# Patient Record
Sex: FEMALE | Race: AMERICAN INDIAN OR ALASKA NATIVE
[De-identification: names, ages, dates, MRNs, and addresses within clinical notes are randomized per-mention and may not be internally consistent; named-entity substitution may affect disease eponyms.]

---

## 2017-04-12 ENCOUNTER — HOSPITAL ENCOUNTER (EMERGENCY)
Dept: HOSPITAL 31 - C.ER | Age: 22
LOS: 1 days | Discharge: HOME | End: 2017-04-13
Payer: MEDICAID

## 2017-04-12 VITALS — OXYGEN SATURATION: 100 %

## 2017-04-12 DIAGNOSIS — Z32.01: Primary | ICD-10-CM

## 2017-04-12 NOTE — C.PDOC
History Of Present Illness


Patient presents to the ER requesting a pregnancy test. Patient states she 

missed her menstrual period which was supposed to be on 03/22. Denies any 

physical complaints at this time. 


Time Seen by Provider: 04/12/17 23:49


Chief Complaint (Nursing): Abdominal Pain


History Per: Patient


History/Exam Limitations: no limitations


Current Symptoms Are (Timing): Still Present


Associated Symptoms: denies: Fever, Chills, Nausea, Vomiting, Diarrhea





Past Medical History


Reviewed: Historical Data, Nursing Documentation, Vital Signs


Vital Signs: 


 Last Vital Signs











Temp  98.4 F   04/12/17 23:42


 


Pulse  69   04/12/17 23:42


 


Resp  16   04/12/17 23:42


 


BP  125/73   04/12/17 23:42


 


Pulse Ox  100   04/13/17 01:16











Family History: States: No Known Family Hx





- Social History


Hx Alcohol Use: Yes


Hx Substance Use: No





- Immunization History


Hx Tetanus Toxoid Vaccination: No


Hx Influenza Vaccination: No


Hx Pneumococcal Vaccination: No





Review Of Systems


Constitutional: Negative for: Fever, Chills


Respiratory: Negative for: Cough, Shortness of Breath


Gastrointestinal: Negative for: Nausea, Vomiting, Abdominal Pain, Diarrhea





Physical Exam





- Physical Exam


Appears: Well, Non-toxic


Skin: Warm, Dry


Oral Mucosa: Moist


Cardiovascular: Rhythm Regular


Respiratory: No Rales, No Rhonchi, No Wheezing


Gastrointestinal/Abdominal: Soft, No Tenderness


Neurological/Psych: Oriented x3





ED Course And Treatment





- Laboratory Results


Result Diagrams: 


 04/13/17 00:30





 04/13/17 00:30


O2 Sat by Pulse Oximetry: 100 (Room air)


Pulse Ox Interpretation: Normal


Progress Note: Blood type and screen, HCG quantitative test, and blood work 

ordered.


Reevaluation Time: 01:20


Reassessment Condition: Improved





Disposition


Counseled Patient/Family Regarding: Studies Performed, Diagnosis, Need For 

Followup





- Disposition


Referrals: 


[facility] at [facility] [Outside]


Disposition: HOME/ ROUTINE


Disposition Time: 23:50


Condition: FAIR


Instructions:  Pregnancy (ED)





- Clinical Impression


Clinical Impression: 


 Pregnancy





- Scribe Statement


The provider has reviewed the documentation as recorded by the Scribe


Jimmy Mchugh





All medical record entries made by the Scribe were at my direction and 

personally dictated by me. I have reviewed the chart and agree that the record 

accurately reflects my personal performance of the history, physical exam, 

medical decision making, and the department course for this patient. I have 

also personally directed, reviewed, and agree with the discharge instructions 

and disposition.

## 2017-04-13 VITALS
HEART RATE: 68 BPM | RESPIRATION RATE: 18 BRPM | SYSTOLIC BLOOD PRESSURE: 101 MMHG | TEMPERATURE: 98.6 F | DIASTOLIC BLOOD PRESSURE: 58 MMHG

## 2017-04-13 LAB
ALBUMIN/GLOB SERPL: 1.1 {RATIO} (ref 1–2.1)
ALP SERPL-CCNC: 57 U/L (ref 38–126)
ALT SERPL-CCNC: 13 U/L (ref 9–52)
AST SERPL-CCNC: 21 U/L (ref 14–36)
BACTERIA #/AREA URNS HPF: (no result) /[HPF]
BASOPHILS # BLD AUTO: 0 K/UL (ref 0–0.2)
BASOPHILS NFR BLD: 0.4 % (ref 0–2)
BILIRUB SERPL-MCNC: 0.5 MG/DL (ref 0.2–1.3)
BILIRUB UR-MCNC: NEGATIVE MG/DL
BUN SERPL-MCNC: 6 MG/DL (ref 7–17)
CALCIUM SERPL-MCNC: 8.3 MG/DL (ref 8.6–10.4)
CHLORIDE SERPL-SCNC: 100 MMOL/L (ref 98–107)
CO2 SERPL-SCNC: 23 MMOL/L (ref 22–30)
EOSINOPHIL # BLD AUTO: 0.1 K/UL (ref 0–0.7)
EOSINOPHIL NFR BLD: 1.2 % (ref 0–4)
ERYTHROCYTE [DISTWIDTH] IN BLOOD BY AUTOMATED COUNT: 16.8 % (ref 11.5–14.5)
GLOBULIN SER-MCNC: 3.6 GM/DL (ref 2.2–3.9)
GLUCOSE SERPL-MCNC: 98 MG/DL (ref 65–105)
GLUCOSE UR STRIP-MCNC: NORMAL MG/DL
HCT VFR BLD CALC: 36.9 % (ref 34–47)
INR PPP: 1
KETONES UR STRIP-MCNC: NEGATIVE MG/DL
LEUKOCYTE ESTERASE UR-ACNC: (no result) LEU/UL
LYMPHOCYTES # BLD AUTO: 2 K/UL (ref 1–4.3)
LYMPHOCYTES NFR BLD AUTO: 31.9 % (ref 20–40)
MCH RBC QN AUTO: 27.3 PG (ref 27–31)
MCHC RBC AUTO-ENTMCNC: 32.5 G/DL (ref 33–37)
MCV RBC AUTO: 84.1 FL (ref 81–99)
MONOCYTES # BLD: 0.4 K/UL (ref 0–0.8)
MONOCYTES NFR BLD: 6.1 % (ref 0–10)
NRBC BLD AUTO-RTO: 0 % (ref 0–2)
PH UR STRIP: 6 [PH] (ref 5–8)
PLATELET # BLD: 235 K/UL (ref 130–400)
PMV BLD AUTO: 8.3 FL (ref 7.2–11.7)
POTASSIUM SERPL-SCNC: 3.8 MMOL/L (ref 3.6–5.2)
PROT SERPL-MCNC: 7.6 G/DL (ref 6.3–8.3)
PROT UR STRIP-MCNC: NEGATIVE MG/DL
RBC # UR STRIP: NEGATIVE /UL
RBC #/AREA URNS HPF: 1 /HPF (ref 0–3)
SODIUM SERPL-SCNC: 140 MMOL/L (ref 132–148)
SP GR UR STRIP: 1 (ref 1–1.03)
UROBILINOGEN UR-MCNC: NORMAL MG/DL (ref 0.2–1)
WBC # BLD AUTO: 6.2 K/UL (ref 4.8–10.8)
WBC #/AREA URNS HPF: 1 /HPF (ref 0–5)

## 2018-11-03 ENCOUNTER — HOSPITAL ENCOUNTER (EMERGENCY)
Dept: HOSPITAL 31 - C.ER | Age: 23
Discharge: LEFT BEFORE DISCHARGE | End: 2018-11-03
Payer: MEDICAID

## 2018-11-03 VITALS
DIASTOLIC BLOOD PRESSURE: 107 MMHG | SYSTOLIC BLOOD PRESSURE: 155 MMHG | HEART RATE: 72 BPM | TEMPERATURE: 99 F | RESPIRATION RATE: 20 BRPM

## 2018-11-03 VITALS — OXYGEN SATURATION: 100 %

## 2018-11-03 DIAGNOSIS — J11.1: ICD-10-CM

## 2018-11-03 DIAGNOSIS — F17.210: ICD-10-CM

## 2018-11-03 DIAGNOSIS — I10: Primary | ICD-10-CM

## 2018-11-03 LAB
ALBUMIN SERPL-MCNC: 4.8 [, G/DL] (ref 3.5–5)
ALBUMIN/GLOB SERPL: 1.4 [,] (ref 1–2.1)
ALT SERPL-CCNC: 25 [, U/L] (ref 9–52)
AST SERPL-CCNC: 35 [, U/L] (ref 14–36)
BACTERIA #/AREA URNS HPF: (no result) [,]
BASOPHILS # BLD AUTO: 0.1 [, K/UL] (ref 0–0.2)
BASOPHILS NFR BLD: 0.7 [, %] (ref 0–2)
BILIRUB UR-MCNC: NEGATIVE [,]
BUN SERPL-MCNC: 8 [, MG/DL] (ref 7–17)
CALCIUM SERPL-MCNC: 10 [, MG/DL] (ref 8.6–10.4)
EOSINOPHIL # BLD AUTO: 0.5 [, K/UL] (ref 0–0.7)
EOSINOPHIL NFR BLD: 6.8 [, %] (ref 0–4)
ERYTHROCYTE [DISTWIDTH] IN BLOOD BY AUTOMATED COUNT: 13.5 [, %] (ref 11.5–14.5)
GFR NON-AFRICAN AMERICAN: > 60 [,]
GLUCOSE UR STRIP-MCNC: NORMAL [, MG/DL]
HGB BLD-MCNC: 14.7 [, G/DL] (ref 11–16)
LEUKOCYTE ESTERASE UR-ACNC: (no result) [, LEU/UL]
LYMPHOCYTES # BLD AUTO: 2.1 [, K/UL] (ref 1–4.3)
LYMPHOCYTES NFR BLD AUTO: 27.5 [, %] (ref 20–40)
MCH RBC QN AUTO: 31.6 [, PG] (ref 27–31)
MCHC RBC AUTO-ENTMCNC: 34.6 [, G/DL] (ref 33–37)
MCV RBC AUTO: 91.1 [, FL] (ref 81–99)
MONOCYTES # BLD: 0.5 [, K/UL] (ref 0–0.8)
MONOCYTES NFR BLD: 6.9 [, %] (ref 0–10)
NEUTROPHILS # BLD: 4.5 [, K/UL] (ref 1.8–7)
NEUTROPHILS NFR BLD AUTO: 58.1 [, %] (ref 50–75)
NRBC BLD AUTO-RTO: 0.1 [, %] (ref 0–2)
PH UR STRIP: 7 [,] (ref 5–8)
PLATELET # BLD: 298 [, K/UL] (ref 130–400)
PMV BLD AUTO: 7.9 [, FL] (ref 7.2–11.7)
PROT UR STRIP-MCNC: NEGATIVE [, MG/DL]
RBC # BLD AUTO: 4.65 [, MIL/UL] (ref 3.8–5.2)
RBC # UR STRIP: NEGATIVE [,]
SP GR UR STRIP: 1 [,] (ref 1–1.03)
SQUAMOUS EPITHIAL: 1 [, /HPF] (ref 0–5)
UROBILINOGEN UR-MCNC: NORMAL [, MG/DL] (ref 0.2–1)
WBC # BLD AUTO: 7.8 [, K/UL] (ref 4.8–10.8)

## 2018-11-03 PROCEDURE — 93005 ELECTROCARDIOGRAM TRACING: CPT

## 2018-11-03 PROCEDURE — 96374 THER/PROPH/DIAG INJ IV PUSH: CPT

## 2018-11-03 PROCEDURE — 85025 COMPLETE CBC W/AUTO DIFF WBC: CPT

## 2018-11-03 PROCEDURE — 71046 X-RAY EXAM CHEST 2 VIEWS: CPT

## 2018-11-03 PROCEDURE — 81001 URINALYSIS AUTO W/SCOPE: CPT

## 2018-11-03 PROCEDURE — 99285 EMERGENCY DEPT VISIT HI MDM: CPT

## 2018-11-03 PROCEDURE — 87070 CULTURE OTHR SPECIMN AEROBIC: CPT

## 2018-11-03 PROCEDURE — 87430 STREP A AG IA: CPT

## 2018-11-03 PROCEDURE — 80053 COMPREHEN METABOLIC PANEL: CPT

## 2018-11-03 NOTE — C.PDOC
History Of Present Illness





23 years old female presents to ED for complaint of continuous cough, cold, sore

throat, and fever that began 4 days ago. Denies any medical problems or any 

other physical complaints. 


Time Seen by Provider: 11/03/18 08:40


Chief Complaint (Nursing): ENT Problem


History Per: Patient


History/Exam Limitations: None


Onset/Duration Of Symptoms: Days (4)


Current Symptoms Are (Timing): Still Present


Symptoms Have Been: Continuous


Anticoagulant/Antiplatlet Use?: Unknown


Recent Aspirin Use: Unknown





Past Medical History


Reviewed: Historical Data, Nursing Documentation, Vital Signs


Vital Signs: 





                                Last Vital Signs











Temp  98.9 F   11/03/18 08:29


 


Pulse  84   11/03/18 08:29


 


Resp  16   11/03/18 08:29


 


BP  171/133 H  11/03/18 08:29


 


Pulse Ox  100   11/03/18 08:29














- Medical History


PMH: No Chronic Diseases


Surgical History: No Surg Hx


Family History: States: Unknown Family Hx





- Social History


Hx Alcohol Use: Yes


Hx Substance Use: No





- Immunization History


Hx Tetanus Toxoid Vaccination: No


Hx Influenza Vaccination: No


Hx Pneumococcal Vaccination: No





Review Of Systems


Constitutional: Positive for: Fever, Other (Cold symptoms ).  Negative for: 

Chills


ENT: Positive for: Throat Pain


Respiratory: Positive for: Cough


Gastrointestinal: Negative for: Nausea, Vomiting, Diarrhea


Skin: Negative for: Rash


Neurological: Negative for: Weakness, Numbness





Physical Exam





- Physical Exam


Appears: Non-toxic, No Acute Distress


Skin: Normal Color, Warm, Dry, No Rash


Head: Atraumatic, Normacephalic


Eye(s): bilateral: Normal Inspection, PERRL, EOMI


Ear(s): Bilateral: Normal


Nose: Normal


Oral Mucosa: Moist


Tongue: Normal Appearing


Lips: Normal Appearing


Throat: Erythema (Mild of pharynx)


Neck: Normal ROM, Supple


Chest: Symmetrical, No Tenderness


Cardiovascular: Rhythm Regular, No Murmur


Respiratory: Normal Breath Sounds, No Rales, No Rhonchi, No Wheezing


Gastrointestinal/Abdominal: Normal Exam, Bowel Sounds (Active ), Soft, No 

Tenderness


Extremity: Normal ROM


Extremity: Bilateral: Atraumatic, Normal Color And Temperature, Normal ROM


Pulses: Left Radial: Normal, Right Radial: Normal


Neurological/Psych: Oriented x3, Normal Speech


Gait: Steady





ED Course And Treatment





- Laboratory Results


Result Diagrams: 


                                 11/03/18 10:48





                                 11/03/18 10:48


Lab Interpretation: Normal


Urine Pregnancy POC: Negative


ECG: Interpreted By Me


ECG Rhythm: Sinus Rhythm


O2 Sat by Pulse Oximetry: 100 (RA)


Pulse Ox Interpretation: Normal





- Radiology


CXR: Interpreted by Me


CXR Interpretation: Yes: No Acute Disease





- Other Rad


  ** CXR


X-Ray: Viewed By Me, Read By Radiologist


Interpretation: Date of service:  11/03/2018.  HISTORY:  cough.  COMPARISON:  No

prior.  TECHNIQUE:  Chest PA and lateral.  FINDINGS:  LUNGS:  No active pulmon

ashley disease.  PLEURA:  No significant pleural effusion identified. No 

pneumothorax apparent.  CARDIOVASCULAR:  No aortic atherosclerotic calcification

present.  Normal cardiac size. No pulmonary vascular congestion.  OSSEOUS 

STRUCTURES:  No significant abnormalities.  VISUALIZED UPPER ABDOMEN:  Normal.  

OTHER FINDINGS:  None.  IMPRESSION:  No acute cardiopulmonary disease 

appreciated.


Progress Note: Patient found to have elevated B/P and reports no history of 

Hypertension.  Trated with norvasc 5 mg PO.  Patient request discharge and will 

sign out AMA.  Patient advised to follow up with PMD for evaluation of B/P


Reassessment Condition: Improved





Medical Decision Making


Medical Decision Making: 





Plan:


* Rapid Strep


* Throat Culture


* CXR





Re-Evaluation:


The patient declines to have further medical evaluation and treatment and wishes

to leave the Emergency Department. This action is against my medical advice to 

the patient, and with informed refusal. The patient was told that evaluation and

treatment are necessary and a full explanation of the rationale was given. The 

risks of leaving were explained to the patient and include, but are not limited 

to, worsening of known or currently unknown conditions, permanent disability and

death from undiagnosed or untreated conditions The patient has the capacity to 

make this informed decision and understands the clinical situation and my 

explanation of the risks of leaving. The patient voluntarily accepts these 

risks, and a signed AMA form documenting our conversation was obtained. The 

patient was given the opportunity to ask questions and reconsider. The patient 

was encouraged to return to the Emergency Department at any time for further 

care.





Disposition


Counseled Patient/Family Regarding: Studies Performed, Diagnosis, Need For 

Followup, Rx Given





- Disposition


Disposition: AGAINST MEDICAL ADVICE


Disposition Time: 12:40


Condition: STABLE


Additional Instructions: 


Follow up with your PMD for evaluation of high B/P


Prescriptions: 


amLODIPine [Norvasc] 5 mg PO DAILY #30 tab


Instructions:  Sore Throat in Adults, High Blood Pressure (DC), Viral 

Pharyngitis  (DC)


Forms:  [facility] (English)





- POA


Present On Arrival: None





- Clinical Impression


Clinical Impression: 


 Hypertension, Flu-like symptoms








- PA / NP / Resident Statement


MD/DO has reviewed & agrees with the documentation as recorded.





- Scribe Statement


The provider has reviewed the documentation as recorded by the Floribsoto Leonard





All medical record entries made by the Laura were at my direction and 

personally dictated by me. I have reviewed the chart and agree that the record 

accurately reflects my personal performance of the history, physical exam, 

medical decision making, and the department course for this patient. I have also

 personally directed, reviewed, and agree with the discharge instructions and 

disposition.

## 2019-03-25 ENCOUNTER — HOSPITAL ENCOUNTER (EMERGENCY)
Dept: HOSPITAL 31 - C.ER | Age: 24
Discharge: HOME | End: 2019-03-25
Payer: MEDICAID

## 2019-03-25 VITALS
DIASTOLIC BLOOD PRESSURE: 108 MMHG | TEMPERATURE: 97.5 F | SYSTOLIC BLOOD PRESSURE: 147 MMHG | RESPIRATION RATE: 18 BRPM | HEART RATE: 100 BPM | OXYGEN SATURATION: 98 %
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
## 2019-03-25 DIAGNOSIS — S90.31XA: Primary | ICD-10-CM

## 2019-03-25 DIAGNOSIS — L30.9: ICD-10-CM

## 2019-03-25 NOTE — C.PDOC
History Of Present Illness


23 year old female presents to the ED for evaluation of right leg pain which 

began today. Patient states that a couch fell onto her right foot, and then she 

developed pain to her entire right leg shortly after. Patient also indicates 

eczema to her antecubital regions. She denies fever, chills, any other 

trauma/injuries.


Time Seen by Provider: 03/25/19 19:28


Chief Complaint (Nursing): Lower Extremity Problem/Injury


History Per: Patient


History/Exam Limitations: no limitations


Onset/Duration Of Symptoms: Hrs


Current Symptoms Are (Timing): Still Present


Additional History Per: Patient





Past Medical History


Reviewed: Historical Data, Nursing Documentation, Vital Signs


Vital Signs: 





                                Last Vital Signs











Temp  97.5 F L  03/25/19 19:04


 


Pulse  100 H  03/25/19 19:04


 


Resp  18   03/25/19 19:04


 


BP  147/108 H  03/25/19 19:04


 


Pulse Ox  98   03/25/19 19:04














- Medical History


PMH: No Chronic Diseases


Surgical History: No Surg Hx


Family History: States: Unknown Family Hx





- Social History


Hx Alcohol Use: Yes


Hx Substance Use: No





- Immunization History


Hx Tetanus Toxoid Vaccination: No


Hx Influenza Vaccination: Yes


Hx Pneumococcal Vaccination: Yes





Review Of Systems


Constitutional: Negative for: Fever, Chills, Weakness


Musculoskeletal: Positive for: Leg Pain (right), Foot Pain (right)


Neurological: Negative for: Weakness, Numbness





Physical Exam





- Physical Exam


Appears: Well, Non-toxic, No Acute Distress


Skin: Warm, Dry, Rash (dry eczema-appearing rash to bilateral antecubital 

regions )


Extremity: Normal ROM, Tenderness (immediately below right lateral malleolus ), 

No Calf Tenderness, Capillary Refill (less than 2 seconds ), No Deformity, No 

Swelling (right foot ), Other (no erythema to right foot, no tenderness to 5th 

metatarsal )


Pulses: Left Dorsalis Pedis: Normal, Right Dorsalis Pedis: Normal


Neurological/Psych: Oriented x3, Normal Speech, Normal Motor, Normal Sensation





ED Course And Treatment


O2 Sat by Pulse Oximetry: 98 (on RA)


Pulse Ox Interpretation: Normal





Medical Decision Making


Medical Decision Making: 





Progress: 


Right foot XR ordered and reviewed. 





On reassessment, patient is resting comfortably, showing no signs of distress, 

and is stable for discharge. Patient is advised to follow up with PMD within 1-2

days for further evaluation. 





Disposition


Counseled Patient/Family Regarding: Diagnosis, Need For Followup, Rx Given





- Disposition


Referrals: 


[facility] at [facility] [Outside]


Disposition: HOME/ ROUTINE


Disposition Time: 20:38


Condition: STABLE


Prescriptions: 


Triamcinolone 0.1% [Triamcinolone 0.1% Cream] 1 gm TP BID #1 tube


Instructions:  Contusion (DC), Eczema (Atopic Dermatitis) (DC)


Forms:  General Discharge Instructions, [facility] (English), Work Excuse





- Clinical Impression


Clinical Impression: 


 Contusion of foot, right, Eczema








- PA / NP / Resident Statement


MD/DO has reviewed & agrees with the documentation as recorded.





- Scribe Statement


The provider has reviewed the documentation as recorded by the Scribe (Sheri Mahoney)








All medical record entries made by the Scribe were at my direction and 

personally dictated by me. I have reviewed the chart and agree that the record 

accurately reflects my personal performance of the history, physical exam, 

medical decision making, and the department course for this patient. I have also

personally directed, reviewed, and agree with the discharge instructions and 

disposition.

## 2019-03-26 NOTE — RAD
Date of service: 



03/25/2019



PROCEDURE:  Right Foot Radiographs.



HISTORY:

Rule out fracture



COMPARISON:

None.



TECHNIQUE:

3 views obtained.



FINDINGS:



BONES:

Normal. No fracture. 



JOINTS:

Normal. 



SOFT TISSUES:

Normal. 



OTHER FINDINGS:

None.



IMPRESSION:

Normal right foot radiographs.
